# Patient Record
Sex: FEMALE | Race: OTHER
[De-identification: names, ages, dates, MRNs, and addresses within clinical notes are randomized per-mention and may not be internally consistent; named-entity substitution may affect disease eponyms.]

---

## 2020-05-01 PROBLEM — Z00.00 ENCOUNTER FOR PREVENTIVE HEALTH EXAMINATION: Status: ACTIVE | Noted: 2020-05-01

## 2020-05-05 ENCOUNTER — APPOINTMENT (OUTPATIENT)
Dept: HEMATOLOGY ONCOLOGY | Facility: CLINIC | Age: 70
End: 2020-05-05
Payer: MEDICARE

## 2020-05-05 DIAGNOSIS — Z78.9 OTHER SPECIFIED HEALTH STATUS: ICD-10-CM

## 2020-05-05 DIAGNOSIS — Z86.79 PERSONAL HISTORY OF OTHER DISEASES OF THE CIRCULATORY SYSTEM: ICD-10-CM

## 2020-05-05 DIAGNOSIS — G45.9 TRANSIENT CEREBRAL ISCHEMIC ATTACK, UNSPECIFIED: ICD-10-CM

## 2020-05-05 DIAGNOSIS — Z80.3 FAMILY HISTORY OF MALIGNANT NEOPLASM OF BREAST: ICD-10-CM

## 2020-05-05 PROCEDURE — 99214 OFFICE O/P EST MOD 30 MIN: CPT | Mod: 95

## 2020-05-05 PROCEDURE — 99203 OFFICE O/P NEW LOW 30 MIN: CPT | Mod: 95

## 2020-05-05 NOTE — REASON FOR VISIT
[Initial Consultation] : an initial consultation [FreeTextEntry2] : Breast cancer here for first visit in this office

## 2020-05-05 NOTE — HISTORY OF PRESENT ILLNESS
[Home] : at home, [unfilled] , at the time of the visit. [Medical Office: ([facility])___] : at the medical office located in  [Self] : self [Patient] : the patient [T: ___] : T[unfilled] [N: ___] : N[unfilled] [AJCC Stage: ____] : AJCC Stage: [unfilled] [0 - No Distress] : Distress Level: 0 [de-identified] : This is a 68 y.o woman with a hx of breast cancer in march 2018. She had surgery with dr silverman at [facility], where she had a mastecotmy on the left. This revealed 3 foci of invasive ductal carcinoma, largest ws 12 mm , 7 mm and third was 6 mm. DCIS and LCIS were also present. ER pos greater then 90%. 1 %AK for the 7 mm and AK 90% fpr the remainder. One out of 8 nodes  were pos for cancer.  Final staging was [id number]. \par Oncotype RS was 11 with a 9% recurrence for 1-3 pos nodes , second was a score of 0, with a 6 % risk of recurrence . Patient was seen by another heme onc , and no chemo was recommended. Started letrozole in aug 2018. \par \par \par Osteoporosis may 2019.(worse compared to prior) \par \par Hx of iron deficiency but hasn’t done eval yet \par  [de-identified] : Patient is feeling well , she denies any new complaints \par Continues on femara , tolerating well, no sig joint pains or hot flashes \par She just returned from [place] and then has been on isolation\par Has not gone to see GI yet \par Mammogram is over due bc of covid outbreak  [de-identified] : IDC

## 2020-05-05 NOTE — PHYSICAL EXAM
[Fully active, able to carry on all pre-disease performance without restriction] : Status 0 - Fully active, able to carry on all pre-disease performance without restriction [Normal] : grossly intact [de-identified] : no obvious resp distress

## 2020-05-05 NOTE — ASSESSMENT
[FreeTextEntry1] : This is a 68 y/o woman with a hx of [id number], IDC with 1 out of 8 positive nodes . ER pos her2 negative . Oncotype low risk. (zero and 11) , She saw another opinion and chemo was not recommended. She began femara on aug 2018. \par Patient also has hx of iron def , b12 def and vit d def. \par Also has hx of osteoporosis..\par \par 1) breast cancer \par doing well josé \par femara tolerating well \par cont femara at least 5 years \par markers normal last  visit repeat now  \par will mail script for labs \par \par \par 2) osteoporosis\par check dexa every 2 years\par cont vit d check level \par encoaurged exercise \par \par 3) vit d def \par cont vit d \par check level \par \par 4) b12 def \par cont vit b12 check level \par \par 5) iron def \par advised gi eval , discussed the risk of gi bleeding and risk of cancer \par start oral iron for now \par repeat levels will mail script \par \par gyn advised \par \par patient advised to be seen in 3 months  as an inperson visit \par \par Patient is aware that telehealth is not a substitute for an in person exam and history. There may be implications in terms of future diagnosis prognosis and management.

## 2020-09-08 ENCOUNTER — LABORATORY RESULT (OUTPATIENT)
Age: 70
End: 2020-09-08

## 2020-09-09 ENCOUNTER — APPOINTMENT (OUTPATIENT)
Dept: HEMATOLOGY ONCOLOGY | Facility: CLINIC | Age: 70
End: 2020-09-09
Payer: MEDICARE

## 2020-09-09 VITALS
BODY MASS INDEX: 24.27 KG/M2 | HEIGHT: 66 IN | OXYGEN SATURATION: 96 % | DIASTOLIC BLOOD PRESSURE: 90 MMHG | SYSTOLIC BLOOD PRESSURE: 176 MMHG | HEART RATE: 69 BPM | TEMPERATURE: 97.9 F | WEIGHT: 151 LBS

## 2020-09-09 VITALS — SYSTOLIC BLOOD PRESSURE: 146 MMHG | DIASTOLIC BLOOD PRESSURE: 90 MMHG

## 2020-09-09 PROCEDURE — 99214 OFFICE O/P EST MOD 30 MIN: CPT

## 2020-09-09 NOTE — ASSESSMENT
[FreeTextEntry1] : This is a 70 y/o woman with a hx of [id number], IDC with 1 out of 8 positive nodes . ER pos her2 negative . Oncotype low risk. (zero and 11) , She saw another opinion and chemo was not recommended. She began femara on aug 2018. \par Patient also has hx of iron def , b12 def and vit d def. \par Also has hx of osteoporosis..\par \par 1) breast cancer \par doing well josé \par femara tolerating well \par cont femara at least 5 years consider 10 given excellent tolerance \par markers normal last  visit repeat now  \par mammogram is uptodate \par \par \par 2) osteoporosis\par check dexa every 2 years( will get results) \par cont vit d check level \par encoaurged exercise \par \par 3) vit d def \par cont vit d \par check level \par \par 4) b12 def \par cont vit b12 check level \par \par 5) iron def \par again stressed importance of GI evaluation \par repeat levels \par patient is unable to take iron \par \par 6) health maintnance \par gyn advised \par \par \par follow up in 4-5 months \par dw patient at length

## 2020-09-09 NOTE — PHYSICAL EXAM
[Restricted in physically strenuous activity but ambulatory and able to carry out work of a light or sedentary nature] : Status 1- Restricted in physically strenuous activity but ambulatory and able to carry out work of a light or sedentary nature, e.g., light house work, office work [Normal] : affect appropriate [de-identified] : no masses or adenopathy bilat , no skin lesions

## 2020-09-09 NOTE — HISTORY OF PRESENT ILLNESS
[T: ___] : T[unfilled] [N: ___] : N[unfilled] [AJCC Stage: ____] : AJCC Stage: [unfilled] [0 - No Distress] : Distress Level: 0 [de-identified] : This is a 68 y.o woman with a hx of breast cancer in 2018. She had surgery with dr silverman at [facility], where she had a mastecotmy on the left. This revealed 3 foci of invasive ductal carcinoma, largest ws 12 mm , 7 mm and third was 6 mm. DCIS and LCIS were also present. ER pos greater then 90%. 1 %AR for the 7 mm and AR 90% fpr the remainder. One out of 8 nodes  were pos for cancer.  Final staging was [id number]. \par Oncotype RS was 11 with a 9% recurrence for 1-3 pos nodes , second was a score of 0, with a 6 % risk of recurrence . Patient was seen by another heme onc , and no chemo was recommended. Started letrozole in aug 2018. \par \par \par Osteoporosis may 2019.(worse compared to prior) \par \par Hx of iron deficiency but hasn’t done eval yet \par \par Patients son lives in [place] other lives here , her   a number of years ago  [de-identified] : IDC  [de-identified] : femara going well , no new complaints no worsening symptoms \par mammogram in may [facility] , all was ok \par taking b12 , tolerating well \par not taking iron and did not do any gi eval \par had headache for 3 days went away \par no abd pain or bloating \par no sob cough \par weight is stable \par \par

## 2020-09-10 LAB
CANCER AG15-3 SERPL-ACNC: 10.9 U/ML
CEA SERPL-MCNC: 1.1 NG/ML
FOLATE SERPL-MCNC: 8.7 NG/ML
IRON SATN MFR SERPL: 21 %
IRON SERPL-MCNC: 86 UG/DL
TIBC SERPL-MCNC: 414 UG/DL
UIBC SERPL-MCNC: 328 UG/DL
VIT B12 SERPL-MCNC: 1621 PG/ML

## 2020-09-14 LAB — FERRITIN SERPL-MCNC: 26 NG/ML

## 2020-12-04 RX ORDER — LETROZOLE TABLETS 2.5 MG/1
2.5 TABLET, FILM COATED ORAL DAILY
Qty: 30 | Refills: 2 | Status: ACTIVE | COMMUNITY
Start: 2020-12-04 | End: 1900-01-01

## 2021-02-22 RX ORDER — LETROZOLE TABLETS 2.5 MG/1
2.5 TABLET, FILM COATED ORAL DAILY
Qty: 90 | Refills: 0 | Status: ACTIVE | COMMUNITY
Start: 2020-08-13 | End: 1900-01-01

## 2021-03-24 ENCOUNTER — APPOINTMENT (OUTPATIENT)
Dept: HEMATOLOGY ONCOLOGY | Facility: CLINIC | Age: 71
End: 2021-03-24

## 2021-04-28 ENCOUNTER — APPOINTMENT (OUTPATIENT)
Dept: HEMATOLOGY ONCOLOGY | Facility: CLINIC | Age: 71
End: 2021-04-28
Payer: MEDICARE

## 2021-04-28 VITALS
SYSTOLIC BLOOD PRESSURE: 158 MMHG | HEART RATE: 70 BPM | RESPIRATION RATE: 18 BRPM | WEIGHT: 144.4 LBS | BODY MASS INDEX: 23.31 KG/M2 | OXYGEN SATURATION: 99 % | TEMPERATURE: 98 F | DIASTOLIC BLOOD PRESSURE: 93 MMHG

## 2021-04-28 PROCEDURE — 99214 OFFICE O/P EST MOD 30 MIN: CPT

## 2021-04-28 NOTE — HISTORY OF PRESENT ILLNESS
[de-identified] : This is a 68 y.o woman with a hx of breast cancer in 2018. She had surgery with dr silverman at [facility], where she had a mastecotmy on the left. This revealed 3 foci of invasive ductal carcinoma, largest ws 12 mm , 7 mm and third was 6 mm. DCIS and LCIS were also present. ER pos greater then 90%. 1 %KY for the 7 mm and [place] 90% fpr the remainder. One out of 8 nodes  were pos for cancer.  Final staging was [id number]. \par Oncotype RS was 11 with a 9% recurrence for 1-3 pos nodes , second was a score of 0, with a 6 % risk of recurrence . Patient was seen by another heme onc , and no chemo was recommended. Started letrozole in aug 2018. \par \par \par Osteoporosis may 2019.(worse compared to prior) \par \par Hx of iron deficiency but hasn’t done eval yet \par \par Patients son lives in [place] other lives here , her   a number of years ago  [de-identified] : IDC  [de-identified] : femara going well , no new complaints no worsening symptoms \par mammogram in may [facility] , all was ok \par taking b12 , tolerating well \par tolerating femara \par c/o some hair loss\par not taking iron and did not do any gi eval yet\par had headache for 3 days went away \par no abd pain or bloating \par no sob cough \par Covid vaccinated 02/2021. \par burping a lot, but good appetite \par does intermittent fasting\par concerned regarding indigestion because grandmother had hiatal hernia\par up to date PCP \par no joint pain \par mammogram completed recently\par bone density completed in 2020- will obtain record \par \par \par \par weight is stable \par \par

## 2021-04-28 NOTE — ASSESSMENT
[FreeTextEntry1] : This is a 68 y/o woman with a hx of [id number], IDC with 1 out of 8 positive nodes . ER pos her2 negative . Oncotype low risk. (zero and 11) , She saw another opinion and chemo was not recommended. She began femara on aug 2018. \par Patient also has hx of iron def , b12 def and vit d def. \par Also has hx of osteoporosis..\par \par 1) breast cancer \par doing well josé \par femara tolerating well \par cont femara at least 5 years consider 10 given excellent tolerance , will be limited by patient's osteoporosis\par markers normal last  visit repeat now  \par mammogram is uptodate, get report continue mammogram every 1 year\par \par \par \par 2) osteoporosis\par check dexa every 2 years( will get results) \par cont vit d check level \par encoaurged exercise \par Discussed Prolia versus Zometa, patient would like to wait for now\par \par 3) vit d def \par cont vit d \par check level \par \par 4) b12 def \par cont vit b12 check level \par \par 5) iron def \par again stressed importance of GI evaluation gave the name of 3 different GI docs at [facility]\par repeat levels \par We will try Feosol every other day\par \par 6) health maintnance \par gyn advised \par \par \par follow up in  6 months \par dw patient at length

## 2021-05-02 LAB
25(OH)D3 SERPL-MCNC: 54.2 NG/ML
CANCER AG15-3 SERPL-ACNC: 10.7 U/ML
CEA SERPL-MCNC: 1.2 NG/ML
FERRITIN SERPL-MCNC: 26 NG/ML
FOLATE SERPL-MCNC: 8.7 NG/ML
IRON SATN MFR SERPL: 21 %
IRON SERPL-MCNC: 78 UG/DL
TIBC SERPL-MCNC: 375 UG/DL
UIBC SERPL-MCNC: 296 UG/DL
VIT B12 SERPL-MCNC: 1202 PG/ML

## 2021-06-17 RX ORDER — LETROZOLE TABLETS 2.5 MG/1
2.5 TABLET, FILM COATED ORAL DAILY
Qty: 90 | Refills: 3 | Status: ACTIVE | COMMUNITY
Start: 2020-09-09 | End: 1900-01-01

## 2021-12-03 ENCOUNTER — APPOINTMENT (OUTPATIENT)
Dept: HEMATOLOGY ONCOLOGY | Facility: CLINIC | Age: 71
End: 2021-12-03
Payer: MEDICARE

## 2021-12-03 VITALS
HEART RATE: 80 BPM | BODY MASS INDEX: 22.82 KG/M2 | RESPIRATION RATE: 18 BRPM | TEMPERATURE: 98.1 F | OXYGEN SATURATION: 95 % | HEIGHT: 66 IN | WEIGHT: 142 LBS | DIASTOLIC BLOOD PRESSURE: 97 MMHG | SYSTOLIC BLOOD PRESSURE: 170 MMHG

## 2021-12-03 DIAGNOSIS — M85.80 OTHER SPECIFIED DISORDERS OF BONE DENSITY AND STRUCTURE, UNSPECIFIED SITE: ICD-10-CM

## 2021-12-03 DIAGNOSIS — C50.919 MALIGNANT NEOPLASM OF UNSPECIFIED SITE OF UNSPECIFIED FEMALE BREAST: ICD-10-CM

## 2021-12-03 DIAGNOSIS — E55.9 VITAMIN D DEFICIENCY, UNSPECIFIED: ICD-10-CM

## 2021-12-03 DIAGNOSIS — E53.8 DEFICIENCY OF OTHER SPECIFIED B GROUP VITAMINS: ICD-10-CM

## 2021-12-03 DIAGNOSIS — D50.8 OTHER IRON DEFICIENCY ANEMIAS: ICD-10-CM

## 2021-12-03 PROCEDURE — 99214 OFFICE O/P EST MOD 30 MIN: CPT

## 2021-12-03 NOTE — ASSESSMENT
[FreeTextEntry1] : This is a 70 y/o woman with a hx of [id number], IDC with 1 out of 8 positive nodes . ER pos her2 negative . Oncotype low risk. (zero and 11) , She saw another opinion and chemo was not recommended. She began femara on aug 2018. \par Patient also has hx of iron def , b12 def and vit d def. \par Also has hx of osteoporosis..\par \par 1) breast cancer \par doing well josé \par femara tolerating well \par cont femara at least 5 years consider 10  years \par markers normal last  visit repeat now  \par mammogram is uptodate, get report continue mammogram every 1 year\par \par 2) osteoporosis\par check dexa every 2 years( will get results) \par cont vit d check level \par encoaurged exercise \par again discussed Prolia versus Zometa versus fosamax or boniva , patient would like to wait for now will dw her pcp \par \par 3) vit d def \par cont vit d \par check level \par \par 4) b12 def \par cont vit b12 check level \par \par 5) iron def \par repeat iron studies \par needs to see gi for eval to rule out cause of iron def / blood loss  \par \par 6) health maintnance \par pcp will do pap \par \par \par follow up in  6 months \par dw patient at length

## 2021-12-03 NOTE — HISTORY OF PRESENT ILLNESS
[T: ___] : T[unfilled] [N: ___] : N[unfilled] [AJCC Stage: ____] : AJCC Stage: [unfilled] [de-identified] : This is a 68 y.o woman with a hx of breast cancer in 2018. She had surgery with dr silverman at [facility], where she had a mastecotmy on the left. This revealed 3 foci of invasive ductal carcinoma, largest ws 12 mm , 7 mm and third was 6 mm. DCIS and LCIS were also present. ER pos greater then 90%. 1 %MN for the 7 mm and [place] 90% fpr the remainder. One out of 8 nodes  were pos for cancer.  Final staging was [id number]. \par Oncotype RS was 11 with a 9% recurrence for 1-3 pos nodes , second was a score of 0, with a 6 % risk of recurrence . Patient was seen by another heme onc , and no chemo was recommended. Started letrozole in aug 2018. \par \par \par Osteoporosis may 2019.(worse compared to prior) \par \par Hx of iron deficiency but hasn’t done eval yet \par \par Patients son lives in [place] other lives here , her   a number of years ago  [de-identified] : IDC  [de-identified] : went for mammo every thing was ok [facility] \par bone density - osteoporosis [facility] \par cont to do yoga \par [facility]  for the next 6 weeks \par no new complaints \par cont to do femara \par

## 2021-12-05 LAB
25(OH)D3 SERPL-MCNC: 55.5 NG/ML
CANCER AG15-3 SERPL-ACNC: 12 U/ML
CEA SERPL-MCNC: 1.2 NG/ML
FERRITIN SERPL-MCNC: 25 NG/ML
FOLATE SERPL-MCNC: 9.7 NG/ML
IRON SATN MFR SERPL: 31 %
IRON SERPL-MCNC: 136 UG/DL
TIBC SERPL-MCNC: 434 UG/DL
UIBC SERPL-MCNC: 298 UG/DL
VIT B12 SERPL-MCNC: 1592 PG/ML

## 2021-12-10 ENCOUNTER — NON-APPOINTMENT (OUTPATIENT)
Age: 71
End: 2021-12-10